# Patient Record
Sex: FEMALE | Employment: FULL TIME | URBAN - METROPOLITAN AREA
[De-identification: names, ages, dates, MRNs, and addresses within clinical notes are randomized per-mention and may not be internally consistent; named-entity substitution may affect disease eponyms.]

---

## 2022-09-03 ENCOUNTER — HOSPITAL ENCOUNTER (EMERGENCY)
Age: 22
Discharge: HOME | End: 2022-09-04
Attending: STUDENT IN AN ORGANIZED HEALTH CARE EDUCATION/TRAINING PROGRAM
Payer: COMMERCIAL

## 2022-09-03 DIAGNOSIS — F41.1 ANXIETY STATE: Primary | ICD-10-CM

## 2022-09-03 PROCEDURE — 93005 ELECTROCARDIOGRAM TRACING: CPT

## 2022-09-03 PROCEDURE — 99284 EMERGENCY DEPT VISIT MOD MDM: CPT

## 2022-09-03 RX ORDER — LORAZEPAM 2 MG/ML
2 INJECTION INTRAMUSCULAR ONCE
Status: COMPLETED | OUTPATIENT
Start: 2022-09-03 | End: 2022-09-04

## 2022-09-04 VITALS
SYSTOLIC BLOOD PRESSURE: 126 MMHG | OXYGEN SATURATION: 100 % | DIASTOLIC BLOOD PRESSURE: 75 MMHG | HEIGHT: 69 IN | RESPIRATION RATE: 20 BRPM | HEART RATE: 120 BPM | BODY MASS INDEX: 40.73 KG/M2 | TEMPERATURE: 99.2 F | WEIGHT: 275 LBS

## 2022-09-04 PROCEDURE — 74011250636 HC RX REV CODE- 250/636: Performed by: STUDENT IN AN ORGANIZED HEALTH CARE EDUCATION/TRAINING PROGRAM

## 2022-09-04 PROCEDURE — 96374 THER/PROPH/DIAG INJ IV PUSH: CPT

## 2022-09-04 RX ADMIN — LORAZEPAM 2 MG: 2 INJECTION INTRAMUSCULAR; INTRAVENOUS at 00:13

## 2022-09-04 RX ADMIN — SODIUM CHLORIDE 1000 ML: 9 INJECTION, SOLUTION INTRAVENOUS at 00:13

## 2022-09-04 NOTE — DISCHARGE INSTRUCTIONS
Please follow-up with your primary care doctor as soon as possible. Since your anxiety has been worsening you would likely benefit from starting a daily controller medication.

## 2022-09-04 NOTE — ED TRIAGE NOTES
PT had subway around 2230. Explained that her lips got red and took benadryl which reduced the redness.  Pt states having anxiety and it happens  2 or 3 times a week

## 2022-09-04 NOTE — ED PROVIDER NOTES
70-year-old female with history of migraines and anxiety presents to the ED with chief complaint of anxiety starting this evening. Patient says she ate something and felt itching in her face and was concerned she was having an allergic reaction. She did have anaphylaxis earlier this year. Patient took Benadryl, had improvement in her itching but subsequently has felt extremely anxious. Has had issues with this multiple times a week for the past several weeks, not on any medication at this time. Has been on hydroxyzine in the past.  No fevers, chills, difficulty breathing, chest pain, voice changes, difficulty swallowing, abdominal pain, urinary symptoms, or bowel symptoms. The history is provided by the patient. Anxiety   Pertinent negatives include no abdominal pain, no back pain, no cough, no dizziness, no fever, no headaches, no nausea, no numbness, no shortness of breath, no vomiting and no weakness.       Past Medical History:   Diagnosis Date    Migraine        Past Surgical History:   Procedure Laterality Date    HX CYST REMOVAL      left elbow         Family History:   Problem Relation Age of Onset    Migraines Mother     Migraines Father        Social History     Socioeconomic History    Marital status: SINGLE     Spouse name: Not on file    Number of children: Not on file    Years of education: Not on file    Highest education level: Not on file   Occupational History    Not on file   Tobacco Use    Smoking status: Never    Smokeless tobacco: Not on file   Substance and Sexual Activity    Alcohol use: No    Drug use: No    Sexual activity: Never   Other Topics Concern    Not on file   Social History Narrative    Not on file     Social Determinants of Health     Financial Resource Strain: Not on file   Food Insecurity: Not on file   Transportation Needs: Not on file   Physical Activity: Not on file   Stress: Not on file   Social Connections: Not on file   Intimate Partner Violence: Not on file Housing Stability: Not on file         ALLERGIES: Cephalosporins, Penicillins, Zithromax [azithromycin], and Prevnar [pneumoc 7-lalo conj-dip cr (pf)]    Review of Systems   Constitutional:  Negative for chills and fever. HENT:  Negative for congestion and rhinorrhea. Respiratory:  Negative for cough and shortness of breath. Cardiovascular:  Negative for chest pain and leg swelling. Gastrointestinal:  Negative for abdominal pain, constipation, diarrhea, nausea and vomiting. Genitourinary:  Negative for difficulty urinating, dysuria and hematuria. Musculoskeletal:  Negative for back pain and neck pain. Skin:  Negative for color change and rash. Neurological:  Negative for dizziness, weakness, light-headedness, numbness and headaches. Psychiatric/Behavioral:  Negative for agitation and confusion. The patient is nervous/anxious. Vitals:    09/03/22 2340 09/04/22 0022   BP: (!) 152/93 126/75   Pulse: (!) 147 (!) 120   Resp: 22 20   Temp: 99.2 °F (37.3 °C)    SpO2: 99% 100%   Weight: 124.7 kg (275 lb)    Height: 5' 9\" (1.753 m)             Physical Exam  Constitutional:       General: She is not in acute distress. Appearance: She is well-developed. HENT:      Head: Normocephalic and atraumatic. Eyes:      General: No scleral icterus. Pupils: Pupils are equal, round, and reactive to light. Neck:      Trachea: No tracheal deviation. Cardiovascular:      Rate and Rhythm: Regular rhythm. Tachycardia present. Heart sounds: No murmur heard. No friction rub. No gallop. Pulmonary:      Effort: Pulmonary effort is normal. No respiratory distress. Breath sounds: Normal breath sounds. No wheezing or rales. Abdominal:      General: Bowel sounds are normal. There is no distension. Palpations: Abdomen is soft. Tenderness: There is no abdominal tenderness. Musculoskeletal:         General: No deformity. Cervical back: Neck supple.    Skin:     General: Skin is warm and dry. Neurological:      Mental Status: She is alert and oriented to person, place, and time. Psychiatric:         Behavior: Behavior normal.      Comments: +anxious appearing        MDM  Number of Diagnoses or Management Options  Anxiety state  Diagnosis management comments: 69-year-old female presents to the ED with anxiety. Patient tachycardic, EKG normal other than sinus tachycardia. History and exam consistent with anxiety, reported significant improvement with Ativan and fluids, currently feels back at baseline and ready for discharge. Will discharge with PCP follow-up and strict return precautions. ED Course as of 09/04/22 0302   Gordon Hathaway Sep 04, 2022   0020 ED EKG interpretation:12:20 AM  Rhythm: sinus tachycardia; Rate (approx.): 117; Axis: normal; QRS interval: normal ; ST/T wave: normal; Other findings: normal.    [JW]      ED Course User Index  [JW] Juan Carlos Keen MD       Procedures    DISCHARGE NOTE:  3:08 AM  The patient has been re-evaluated and feeling much better and are stable for discharge. All available radiology and laboratory results have been reviewed with patient and/or available family. Patient and/or family verbally conveyed their understanding and agreement of the patient's signs, symptoms, diagnosis, treatment and prognosis and additionally agree to follow-up as recommended in the discharge instructions or to return to the Emergency Department should their condition change or worsen prior to their follow-up appointment. All questions have been answered and patient and/or available family express understanding. LABORATORY RESULTS:  No results found for this or any previous visit (from the past 24 hour(s)). IMAGING RESULTS:  No results found. MEDICATIONS GIVEN:  Medications   LORazepam (ATIVAN) injection 2 mg (2 mg IntraVENous Given 9/4/22 0013)   sodium chloride 0.9 % bolus infusion 1,000 mL (0 mL IntraVENous IV Completed 9/4/22 0211)       IMPRESSION:  1. Anxiety state        PLAN:  Follow-up Information       Follow up With Specialties Details Why Contact Info    Pretty Marley MD Pediatric Medicine In 3 days  [address] [phone]            Discharge Medication List as of 9/4/2022  1:04 AM          Signed By: Harleen Thomas MD     September 4, 2022

## 2022-09-06 LAB
ATRIAL RATE: 117 BPM
CALCULATED P AXIS, ECG09: 49 DEGREES
CALCULATED R AXIS, ECG10: 47 DEGREES
CALCULATED T AXIS, ECG11: 35 DEGREES
DIAGNOSIS, 93000: NORMAL
P-R INTERVAL, ECG05: 172 MS
Q-T INTERVAL, ECG07: 324 MS
QRS DURATION, ECG06: 90 MS
QTC CALCULATION (BEZET), ECG08: 451 MS
VENTRICULAR RATE, ECG03: 117 BPM